# Patient Record
Sex: FEMALE | Race: WHITE | NOT HISPANIC OR LATINO | Employment: FULL TIME | URBAN - METROPOLITAN AREA
[De-identification: names, ages, dates, MRNs, and addresses within clinical notes are randomized per-mention and may not be internally consistent; named-entity substitution may affect disease eponyms.]

---

## 2018-01-11 NOTE — MISCELLANEOUS
Dear Jace : We missed you for your originally scheduled neurological followup appointment with Dr Dolores Mercer  Please call at your earliest convenience to reschedule this appointment  Sincerely,     Kristopher Pollock 102           Electronically signed by: Juan Miller   Nov 22 2016  9:52AM EST Co-author

## 2018-02-13 ENCOUNTER — TRANSCRIBE ORDERS (OUTPATIENT)
Dept: ADMINISTRATIVE | Facility: HOSPITAL | Age: 53
End: 2018-02-13

## 2018-02-13 DIAGNOSIS — R59.1 LYMPHADENOPATHY: Primary | ICD-10-CM

## 2018-02-14 ENCOUNTER — HOSPITAL ENCOUNTER (OUTPATIENT)
Dept: ULTRASOUND IMAGING | Facility: HOSPITAL | Age: 53
Discharge: HOME | End: 2018-02-14
Payer: COMMERCIAL

## 2018-02-14 DIAGNOSIS — R59.1 LYMPHADENOPATHY: ICD-10-CM

## 2018-02-14 PROCEDURE — 76536 US EXAM OF HEAD AND NECK: CPT

## 2018-03-26 ENCOUNTER — TELEPHONE (OUTPATIENT)
Dept: NEUROLOGY | Facility: CLINIC | Age: 53
End: 2018-03-26

## 2018-05-04 ENCOUNTER — TRANSCRIBE ORDERS (OUTPATIENT)
Dept: ADMINISTRATIVE | Facility: HOSPITAL | Age: 53
End: 2018-05-04

## 2018-05-04 DIAGNOSIS — D25.1 INTRAMURAL LEIOMYOMA OF UTERUS: Primary | ICD-10-CM

## 2018-05-17 ENCOUNTER — HOSPITAL ENCOUNTER (OUTPATIENT)
Dept: ULTRASOUND IMAGING | Facility: HOSPITAL | Age: 53
Discharge: HOME | End: 2018-05-17
Payer: COMMERCIAL

## 2018-05-17 DIAGNOSIS — D25.1 INTRAMURAL LEIOMYOMA OF UTERUS: ICD-10-CM

## 2018-05-17 PROCEDURE — 76830 TRANSVAGINAL US NON-OB: CPT

## 2018-05-17 PROCEDURE — 76856 US EXAM PELVIC COMPLETE: CPT

## 2018-06-11 RX ORDER — METHOCARBAMOL 750 MG/1
TABLET, FILM COATED ORAL
Qty: 90 TABLET | Refills: 2 | OUTPATIENT
Start: 2018-06-11

## 2019-04-23 ENCOUNTER — EVALUATION (OUTPATIENT)
Dept: PHYSICAL THERAPY | Facility: CLINIC | Age: 54
End: 2019-04-23
Payer: COMMERCIAL

## 2019-04-23 DIAGNOSIS — M54.50 CHRONIC BILATERAL LOW BACK PAIN WITHOUT SCIATICA: Primary | ICD-10-CM

## 2019-04-23 DIAGNOSIS — G89.29 CHRONIC BILATERAL LOW BACK PAIN WITHOUT SCIATICA: Primary | ICD-10-CM

## 2019-04-23 PROCEDURE — G0283 ELEC STIM OTHER THAN WOUND: HCPCS | Performed by: PHYSICAL THERAPIST

## 2019-04-23 PROCEDURE — 97140 MANUAL THERAPY 1/> REGIONS: CPT | Performed by: PHYSICAL THERAPIST

## 2019-04-23 PROCEDURE — 97014 ELECTRIC STIMULATION THERAPY: CPT | Performed by: PHYSICAL THERAPIST

## 2019-04-23 PROCEDURE — 97161 PT EVAL LOW COMPLEX 20 MIN: CPT | Performed by: PHYSICAL THERAPIST

## 2019-04-30 ENCOUNTER — OFFICE VISIT (OUTPATIENT)
Dept: PHYSICAL THERAPY | Facility: CLINIC | Age: 54
End: 2019-04-30
Payer: COMMERCIAL

## 2019-04-30 DIAGNOSIS — M54.50 CHRONIC BILATERAL LOW BACK PAIN WITHOUT SCIATICA: Primary | ICD-10-CM

## 2019-04-30 DIAGNOSIS — G89.29 CHRONIC BILATERAL LOW BACK PAIN WITHOUT SCIATICA: Primary | ICD-10-CM

## 2019-04-30 PROCEDURE — 97014 ELECTRIC STIMULATION THERAPY: CPT

## 2019-04-30 PROCEDURE — 97140 MANUAL THERAPY 1/> REGIONS: CPT

## 2019-04-30 PROCEDURE — G0283 ELEC STIM OTHER THAN WOUND: HCPCS

## 2019-05-07 ENCOUNTER — OFFICE VISIT (OUTPATIENT)
Dept: PHYSICAL THERAPY | Facility: CLINIC | Age: 54
End: 2019-05-07
Payer: COMMERCIAL

## 2019-05-07 DIAGNOSIS — G89.29 CHRONIC BILATERAL LOW BACK PAIN WITHOUT SCIATICA: Primary | ICD-10-CM

## 2019-05-07 DIAGNOSIS — M54.50 CHRONIC BILATERAL LOW BACK PAIN WITHOUT SCIATICA: Primary | ICD-10-CM

## 2019-05-07 PROCEDURE — 97140 MANUAL THERAPY 1/> REGIONS: CPT

## 2019-05-07 PROCEDURE — 97014 ELECTRIC STIMULATION THERAPY: CPT

## 2019-05-07 PROCEDURE — G0283 ELEC STIM OTHER THAN WOUND: HCPCS

## 2019-05-20 ENCOUNTER — OFFICE VISIT (OUTPATIENT)
Dept: PHYSICAL THERAPY | Facility: CLINIC | Age: 54
End: 2019-05-20
Payer: COMMERCIAL

## 2019-05-20 DIAGNOSIS — M54.50 CHRONIC BILATERAL LOW BACK PAIN WITHOUT SCIATICA: Primary | ICD-10-CM

## 2019-05-20 DIAGNOSIS — G89.29 CHRONIC BILATERAL LOW BACK PAIN WITHOUT SCIATICA: Primary | ICD-10-CM

## 2019-05-20 PROCEDURE — G0283 ELEC STIM OTHER THAN WOUND: HCPCS | Performed by: PHYSICAL THERAPIST

## 2019-05-20 PROCEDURE — 97112 NEUROMUSCULAR REEDUCATION: CPT | Performed by: PHYSICAL THERAPIST

## 2019-05-20 PROCEDURE — 97140 MANUAL THERAPY 1/> REGIONS: CPT | Performed by: PHYSICAL THERAPIST

## 2019-05-20 PROCEDURE — 97014 ELECTRIC STIMULATION THERAPY: CPT | Performed by: PHYSICAL THERAPIST

## 2019-07-23 RX ORDER — METHOCARBAMOL 750 MG/1
1 TABLET, FILM COATED ORAL 3 TIMES DAILY
COMMUNITY
Start: 2015-10-07 | End: 2019-08-01 | Stop reason: ALTCHOICE

## 2019-07-23 RX ORDER — B-COMPLEX WITH VITAMIN C
TABLET ORAL
COMMUNITY

## 2019-07-23 RX ORDER — MELOXICAM 15 MG/1
1 TABLET ORAL DAILY
COMMUNITY
Start: 2015-11-05 | End: 2019-08-01 | Stop reason: ALTCHOICE

## 2019-07-23 RX ORDER — PNV NO.95/FERROUS FUM/FOLIC AC 28MG-0.8MG
TABLET ORAL
COMMUNITY

## 2019-07-23 RX ORDER — VITAMIN B COMPLEX
TABLET ORAL
COMMUNITY

## 2019-07-23 RX ORDER — CLINDAMYCIN AND BENZOYL PEROXIDE 10; 50 MG/G; MG/G
GEL TOPICAL DAILY
COMMUNITY
Start: 2015-01-13

## 2019-07-23 RX ORDER — ERGOCALCIFEROL (VITAMIN D2) 1250 MCG
CAPSULE ORAL
COMMUNITY
Start: 2017-08-12

## 2019-07-24 ENCOUNTER — PREP FOR PROCEDURE (OUTPATIENT)
Dept: GASTROENTEROLOGY | Facility: CLINIC | Age: 54
End: 2019-07-24

## 2019-07-24 ENCOUNTER — OFFICE VISIT (OUTPATIENT)
Dept: GASTROENTEROLOGY | Facility: CLINIC | Age: 54
End: 2019-07-24
Payer: COMMERCIAL

## 2019-07-24 VITALS
HEIGHT: 65 IN | BODY MASS INDEX: 34.42 KG/M2 | SYSTOLIC BLOOD PRESSURE: 142 MMHG | HEART RATE: 94 BPM | WEIGHT: 206.6 LBS | DIASTOLIC BLOOD PRESSURE: 88 MMHG

## 2019-07-24 DIAGNOSIS — K21.00 GASTROESOPHAGEAL REFLUX DISEASE WITH ESOPHAGITIS: Primary | ICD-10-CM

## 2019-07-24 DIAGNOSIS — R14.0 BLOATING: ICD-10-CM

## 2019-07-24 PROCEDURE — 99203 OFFICE O/P NEW LOW 30 MIN: CPT | Performed by: PHYSICIAN ASSISTANT

## 2019-07-24 RX ORDER — DEXLANSOPRAZOLE 60 MG/1
60 CAPSULE, DELAYED RELEASE ORAL DAILY
Qty: 30 CAPSULE | Refills: 2 | Status: SHIPPED | OUTPATIENT
Start: 2019-07-24 | End: 2019-09-17

## 2019-07-24 NOTE — PROGRESS NOTES
[facility] - Outpatient Consultation  Chelsea Barraza 47 y o  female MRN: [id number]  Encounter: [id number]          ASSESSMENT AND PLAN:      1  Gastroesophageal reflux disease with esophagitis    2  Bloating    Symptoms sound related to acid reflux although she reports no improvement with omeprazole, ranitidine or Pepcid  Will plan EGD  Will plan ultrasound  Will start Dexilant    Discuss dietary modification    She is due for a colonoscopy will schedule this as well  Continue probiotic    ______________________________________________________________________    HPI:  59-year-old female presents for evaluation of upper abdominal bloating, chest pain, abdominal pain  Symptoms have been ongoing for approximately 2 years with significant worsening over the past several she reports that she has daily symptoms of epigastric fullness and bloating  This symptom is not constant and it is exacerbated by eating  He and she admits that it does not occur with every meal   She also reports sensatio of a full feeling in her chest   She recently has gained weight  Her bowel movements are regular  There is no rectal bleeding or melena  She denies any dysphagia or hematemesis  She tried omeprazole, ranitidine and famotidine for her symptoms without relief  She is status post cholecystectomy  She had an upper endoscopy in 2 006 was reported as normal   Her last colonoscopy was 5 years ago with Dr Valarie Serrano  She has a history of colon polyps  REVIEW OF SYSTEMS:    CONSTITUTIONAL: Denies any fever, chills, rigors, and weight loss  HEENT: No earache or tinnitus  Denies hearing loss or visual disturbances  CARDIOVASCULAR: No chest pain or palpitations  RESPIRATORY: Denies any cough, hemoptysis, shortness of breath or dyspnea on exertion  GASTROINTESTINAL: As noted in the History of Present Illness  GENITOURINARY: No problems with urination   Denies any hematuria or dysuria  NEUROLOGIC: No dizziness or vertigo, denies headaches  MUSCULOSKELETAL: Denies any muscle or joint pain  SKIN: Denies skin rashes or itching  ENDOCRINE: Denies excessive thirst  Denies intolerance to heat or cold  PSYCHOSOCIAL: Denies depression or anxiety  Denies any recent memory loss  Historical Information   Past Medical History:   Diagnosis Date    Diverticulitis of colon     Hashimoto's disease     Pre-diabetes      History reviewed  No pertinent surgical history  Social History   Social History     Substance and Sexual Activity   Alcohol Use Yes    Comment: occ     Social History     Substance and Sexual Activity   Drug Use Never     Social History     Tobacco Use   Smoking Status Never Smoker   Smokeless Tobacco Never Used     Family History   Problem Relation Age of Onset    Hypertension Mother     Diabetes Mother     Hypertension Father     Diabetes Father        Meds/Allergies       Current Outpatient Medications:     clindamycin-benzoyl peroxide (BENZACLIN) gel    Coenzyme Q10 (COQ10) 100 MG CAPS    Omega-3 Fatty Acids (FISH OIL) 645 MG CAPS    VITAMIN B COMPLEX-C CAPS    dexlansoprazole (DEXILANT) 60 MG capsule    ergocalciferol (ERGOCALCIFEROL) 52676 units capsule    Ferrous Sulfate (IRON) 325 (65 Fe) MG TABS    meloxicam (MOBIC) 15 mg tablet    methocarbamol (ROBAXIN) 750 mg tablet    Specialty Vitamins Products (HEART) 140 MG TABS    No Known Allergies        Objective     Blood pressure 142/88, pulse 94, height 5' 5" (1 651 m), weight 93 7 kg (206 lb 9 6 oz)  Body mass index is 34 38 kg/m²  PHYSICAL EXAM:      General Appearance:   Alert, cooperative, no distress   HEENT:   Normocephalic, atraumatic, anicteric      Neck:  Supple, symmetrical, trachea midline   Lungs:   Clear to auscultation bilaterally; no rales, rhonchi or wheezing; respirations unlabored    Heart[de-identified]   Regular rate and rhythm; no murmur, rub, or gallop     Abdomen:   Soft, non-tender, non-distended; normal bowel sounds; no masses, no organomegaly    Genitalia:   Deferred    Rectal:   Deferred    Extremities:  No cyanosis, clubbing or edema    Pulses:  2+ and symmetric    Skin:  No jaundice, rashes, or lesions    Lymph nodes:  No palpable cervical lymphadenopathy        Lab Results:   No visits with results within 1 Day(s) from this visit  Latest known visit with results is:   No results found for any previous visit  Radiology Results:   No results found

## 2019-07-25 ENCOUNTER — APPOINTMENT (OUTPATIENT)
Dept: LAB | Facility: HOSPITAL | Age: 54
End: 2019-07-25
Payer: COMMERCIAL

## 2019-07-25 DIAGNOSIS — R14.0 BLOATING: ICD-10-CM

## 2019-07-25 LAB
ALBUMIN SERPL BCP-MCNC: 3.9 G/DL (ref 3.5–5)
ALP SERPL-CCNC: 63 U/L (ref 46–116)
ALT SERPL W P-5'-P-CCNC: 24 U/L (ref 12–78)
ANION GAP SERPL CALCULATED.3IONS-SCNC: 6 MMOL/L (ref 4–13)
AST SERPL W P-5'-P-CCNC: 13 U/L (ref 5–45)
BASOPHILS # BLD AUTO: 0.04 THOUSANDS/ΜL (ref 0–0.1)
BASOPHILS NFR BLD AUTO: 1 % (ref 0–1)
BILIRUB SERPL-MCNC: 0.7 MG/DL (ref 0.2–1)
BUN SERPL-MCNC: 14 MG/DL (ref 5–25)
CALCIUM SERPL-MCNC: 9.1 MG/DL (ref 8.3–10.1)
CHLORIDE SERPL-SCNC: 103 MMOL/L (ref 100–108)
CHOLEST SERPL-MCNC: 214 MG/DL (ref 50–200)
CO2 SERPL-SCNC: 30 MMOL/L (ref 21–32)
CREAT SERPL-MCNC: 0.91 MG/DL (ref 0.6–1.3)
EOSINOPHIL # BLD AUTO: 0.4 THOUSAND/ΜL (ref 0–0.61)
EOSINOPHIL NFR BLD AUTO: 6 % (ref 0–6)
ERYTHROCYTE [DISTWIDTH] IN BLOOD BY AUTOMATED COUNT: 12.3 % (ref 11.6–15.1)
GFR SERPL CREATININE-BSD FRML MDRD: 72 ML/MIN/1.73SQ M
GLUCOSE P FAST SERPL-MCNC: 142 MG/DL (ref 65–99)
HCT VFR BLD AUTO: 39.9 % (ref 34.8–46.1)
HDLC SERPL-MCNC: 57 MG/DL (ref 40–60)
HGB BLD-MCNC: 12.9 G/DL (ref 11.5–15.4)
IMM GRANULOCYTES # BLD AUTO: 0.01 THOUSAND/UL (ref 0–0.2)
IMM GRANULOCYTES NFR BLD AUTO: 0 % (ref 0–2)
LDLC SERPL CALC-MCNC: 135 MG/DL (ref 0–100)
LYMPHOCYTES # BLD AUTO: 2.25 THOUSANDS/ΜL (ref 0.6–4.47)
LYMPHOCYTES NFR BLD AUTO: 33 % (ref 14–44)
MCH RBC QN AUTO: 29.5 PG (ref 26.8–34.3)
MCHC RBC AUTO-ENTMCNC: 32.3 G/DL (ref 31.4–37.4)
MCV RBC AUTO: 91 FL (ref 82–98)
MONOCYTES # BLD AUTO: 0.46 THOUSAND/ΜL (ref 0.17–1.22)
MONOCYTES NFR BLD AUTO: 7 % (ref 4–12)
NEUTROPHILS # BLD AUTO: 3.63 THOUSANDS/ΜL (ref 1.85–7.62)
NEUTS SEG NFR BLD AUTO: 53 % (ref 43–75)
NONHDLC SERPL-MCNC: 157 MG/DL
NRBC BLD AUTO-RTO: 0 /100 WBCS
PLATELET # BLD AUTO: 259 THOUSANDS/UL (ref 149–390)
PMV BLD AUTO: 10.8 FL (ref 8.9–12.7)
POTASSIUM SERPL-SCNC: 4.2 MMOL/L (ref 3.5–5.3)
PROT SERPL-MCNC: 7.8 G/DL (ref 6.4–8.2)
RBC # BLD AUTO: 4.38 MILLION/UL (ref 3.81–5.12)
SODIUM SERPL-SCNC: 139 MMOL/L (ref 136–145)
T4 FREE SERPL-MCNC: 0.82 NG/DL (ref 0.76–1.46)
TRIGL SERPL-MCNC: 110 MG/DL
TSH SERPL DL<=0.05 MIU/L-ACNC: 1.83 UIU/ML (ref 0.36–3.74)
WBC # BLD AUTO: 6.79 THOUSAND/UL (ref 4.31–10.16)

## 2019-07-25 PROCEDURE — 82784 ASSAY IGA/IGD/IGG/IGM EACH: CPT

## 2019-07-25 PROCEDURE — 36415 COLL VENOUS BLD VENIPUNCTURE: CPT

## 2019-07-25 PROCEDURE — 86255 FLUORESCENT ANTIBODY SCREEN: CPT

## 2019-07-25 PROCEDURE — 84443 ASSAY THYROID STIM HORMONE: CPT

## 2019-07-25 PROCEDURE — 84439 ASSAY OF FREE THYROXINE: CPT

## 2019-07-25 PROCEDURE — 83516 IMMUNOASSAY NONANTIBODY: CPT

## 2019-07-25 PROCEDURE — 80053 COMPREHEN METABOLIC PANEL: CPT

## 2019-07-25 PROCEDURE — 80061 LIPID PANEL: CPT

## 2019-07-25 PROCEDURE — 85025 COMPLETE CBC W/AUTO DIFF WBC: CPT

## 2019-07-26 ENCOUNTER — HOSPITAL ENCOUNTER (OUTPATIENT)
Dept: ULTRASOUND IMAGING | Facility: CLINIC | Age: 54
Discharge: HOME | End: 2019-07-26
Payer: COMMERCIAL

## 2019-07-26 DIAGNOSIS — R14.0 BLOATING: ICD-10-CM

## 2019-07-26 LAB
ENDOMYSIUM IGA SER QL: NEGATIVE
GLIADIN PEPTIDE IGA SER-ACNC: 5 UNITS (ref 0–19)
GLIADIN PEPTIDE IGG SER-ACNC: 3 UNITS (ref 0–19)
IGA SERPL-MCNC: 258 MG/DL (ref 87–352)
TTG IGA SER-ACNC: <2 U/ML (ref 0–3)
TTG IGG SER-ACNC: 3 U/ML (ref 0–5)

## 2019-07-26 PROCEDURE — 76700 US EXAM ABDOM COMPLETE: CPT

## 2019-07-29 ENCOUNTER — TELEPHONE (OUTPATIENT)
Dept: GASTROENTEROLOGY | Facility: CLINIC | Age: 54
End: 2019-07-29

## 2019-07-29 NOTE — TELEPHONE ENCOUNTER
----- Message from Maryam Yap PA-C sent at 7/29/2019  3:33 PM EDT -----  Please let her know her labs were normal except her cholesterol was slightly elevated and her blood sugar was slightly elevated - she should f/u with her PCP for further treatment recommendations

## 2019-07-31 ENCOUNTER — ANESTHESIA EVENT (OUTPATIENT)
Dept: GASTROENTEROLOGY | Facility: HOSPITAL | Age: 54
End: 2019-07-31

## 2019-08-01 ENCOUNTER — TREATMENT (OUTPATIENT)
Dept: GASTROENTEROLOGY | Facility: CLINIC | Age: 54
End: 2019-08-01

## 2019-08-01 ENCOUNTER — ANESTHESIA (OUTPATIENT)
Dept: GASTROENTEROLOGY | Facility: HOSPITAL | Age: 54
End: 2019-08-01

## 2019-08-01 ENCOUNTER — HOSPITAL ENCOUNTER (OUTPATIENT)
Dept: GASTROENTEROLOGY | Facility: HOSPITAL | Age: 54
Setting detail: OUTPATIENT SURGERY
Discharge: HOME | End: 2019-08-01
Attending: INTERNAL MEDICINE
Payer: COMMERCIAL

## 2019-08-01 VITALS
BODY MASS INDEX: 33.65 KG/M2 | OXYGEN SATURATION: 100 % | HEIGHT: 65 IN | SYSTOLIC BLOOD PRESSURE: 134 MMHG | RESPIRATION RATE: 16 BRPM | HEART RATE: 65 BPM | TEMPERATURE: 97.6 F | WEIGHT: 201.94 LBS | DIASTOLIC BLOOD PRESSURE: 67 MMHG

## 2019-08-01 DIAGNOSIS — R14.0 BLOATING: ICD-10-CM

## 2019-08-01 DIAGNOSIS — K21.00 GASTROESOPHAGEAL REFLUX DISEASE WITH ESOPHAGITIS: ICD-10-CM

## 2019-08-01 DIAGNOSIS — K21.9 GASTROESOPHAGEAL REFLUX DISEASE WITHOUT ESOPHAGITIS: Primary | ICD-10-CM

## 2019-08-01 LAB
EXT PREGNANCY TEST URINE: NEGATIVE
EXT. CONTROL: NORMAL

## 2019-08-01 PROCEDURE — 43239 EGD BIOPSY SINGLE/MULTIPLE: CPT | Performed by: INTERNAL MEDICINE

## 2019-08-01 PROCEDURE — 88305 TISSUE EXAM BY PATHOLOGIST: CPT | Performed by: PATHOLOGY

## 2019-08-01 PROCEDURE — G0121 COLON CA SCRN NOT HI RSK IND: HCPCS | Performed by: INTERNAL MEDICINE

## 2019-08-01 PROCEDURE — 81025 URINE PREGNANCY TEST: CPT | Performed by: ANESTHESIOLOGY

## 2019-08-01 RX ORDER — KETAMINE HCL IN NACL, ISO-OSM 100MG/10ML
SYRINGE (ML) INJECTION AS NEEDED
Status: DISCONTINUED | OUTPATIENT
Start: 2019-08-01 | End: 2019-08-01 | Stop reason: SURG

## 2019-08-01 RX ORDER — LIDOCAINE HYDROCHLORIDE 20 MG/ML
INJECTION, SOLUTION INFILTRATION; PERINEURAL AS NEEDED
Status: DISCONTINUED | OUTPATIENT
Start: 2019-08-01 | End: 2019-08-01 | Stop reason: SURG

## 2019-08-01 RX ORDER — SODIUM CHLORIDE, SODIUM LACTATE, POTASSIUM CHLORIDE, CALCIUM CHLORIDE 600; 310; 30; 20 MG/100ML; MG/100ML; MG/100ML; MG/100ML
125 INJECTION, SOLUTION INTRAVENOUS CONTINUOUS
Status: DISCONTINUED | OUTPATIENT
Start: 2019-08-01 | End: 2019-08-05 | Stop reason: HOSPADM

## 2019-08-01 RX ORDER — PANTOPRAZOLE SODIUM 40 MG/1
40 TABLET, DELAYED RELEASE ORAL DAILY
Qty: 31 TABLET | Refills: 5 | Status: SHIPPED | OUTPATIENT
Start: 2019-08-01

## 2019-08-01 RX ORDER — PROPOFOL 10 MG/ML
INJECTION, EMULSION INTRAVENOUS AS NEEDED
Status: DISCONTINUED | OUTPATIENT
Start: 2019-08-01 | End: 2019-08-01 | Stop reason: SURG

## 2019-08-01 RX ORDER — SODIUM CHLORIDE, SODIUM LACTATE, POTASSIUM CHLORIDE, CALCIUM CHLORIDE 600; 310; 30; 20 MG/100ML; MG/100ML; MG/100ML; MG/100ML
INJECTION, SOLUTION INTRAVENOUS CONTINUOUS PRN
Status: DISCONTINUED | OUTPATIENT
Start: 2019-08-01 | End: 2019-08-01 | Stop reason: SURG

## 2019-08-01 RX ORDER — LIDOCAINE HYDROCHLORIDE 20 MG/ML
INJECTION, SOLUTION INFILTRATION; PERINEURAL AS NEEDED
Status: DISCONTINUED | OUTPATIENT
Start: 2019-08-01 | End: 2019-08-01

## 2019-08-01 RX ADMIN — SODIUM CHLORIDE, SODIUM LACTATE, POTASSIUM CHLORIDE, AND CALCIUM CHLORIDE: .6; .31; .03; .02 INJECTION, SOLUTION INTRAVENOUS at 12:41

## 2019-08-01 RX ADMIN — Medication 20 MG: at 12:51

## 2019-08-01 RX ADMIN — PROPOFOL 130 MG: 10 INJECTION, EMULSION INTRAVENOUS at 12:52

## 2019-08-01 RX ADMIN — PROPOFOL 30 MG: 10 INJECTION, EMULSION INTRAVENOUS at 12:58

## 2019-08-01 RX ADMIN — PROPOFOL 20 MG: 10 INJECTION, EMULSION INTRAVENOUS at 13:01

## 2019-08-01 RX ADMIN — LIDOCAINE HYDROCHLORIDE 5 ML: 20 INJECTION, SOLUTION INFILTRATION; PERINEURAL at 12:51

## 2019-08-01 RX ADMIN — PROPOFOL 20 MG: 10 INJECTION, EMULSION INTRAVENOUS at 12:55

## 2019-08-01 NOTE — ANESTHESIA POSTPROCEDURE EVALUATION
Post-Op Assessment Note    CV Status:  Stable  Pain Score: 0    Pain management: adequate     Mental Status:  Sleepy   Hydration Status:  Stable   PONV Controlled:  None   Airway Patency:  Patent and adequate   Post Op Vitals Reviewed: Yes      Staff: CRNA           BP   150/78   Temp      Pulse  63   Resp   16   SpO2   99%

## 2019-08-01 NOTE — ANESTHESIA PREPROCEDURE EVALUATION
Review of Systems/Medical History  Patient summary reviewed        Cardiovascular  Negative cardio ROS    Pulmonary  Negative pulmonary ROS        GI/Hepatic  Negative GI/hepatic ROS          Negative  ROS        Endo/Other  Diabetes Diet controlled, History of thyroid disease ,      GYN  Negative gynecology ROS          Hematology  Negative hematology ROS      Musculoskeletal  Negative musculoskeletal ROS        Neurology  Negative neurology ROS      Psychology   Negative psychology ROS              Physical Exam    Airway    Mallampati score: I  TM Distance: >3 FB  Neck ROM: full     Dental       Cardiovascular  Comment: Negative ROS, Cardiovascular exam normal    Pulmonary  Pulmonary exam normal     Other Findings        Anesthesia Plan  ASA Score- 2     Anesthesia Type- IV sedation with anesthesia with ASA Monitors  Additional Monitors:   Airway Plan:         Plan Factors-    Induction- intravenous  Postoperative Plan-     Informed Consent- Anesthetic plan and risks discussed with patient  I personally reviewed this patient with the CRNA  Discussed and agreed on the Anesthesia Plan with the CRNA  Felecia Chamorro

## 2019-08-01 NOTE — DISCHARGE INSTRUCTIONS
Colonoscopy   WHAT YOU NEED TO KNOW:   A colonoscopy is a procedure to examine the inside of your colon (intestine) with a scope  Polyps or tissue growths may have been removed during your colonoscopy  It is normal to feel bloated and to have some abdominal discomfort  You should be passing gas  If you have hemorrhoids or you had polyps removed, you may have a small amount of bleeding  DISCHARGE INSTRUCTIONS:   Seek care immediately if:   · You have a large amount of bright red blood in your bowel movements  · Your abdomen is hard and firm and you have severe pain  · You have sudden trouble breathing  Contact your healthcare provider if:   · You develop a rash or hives  · You have a fever within 24 hours of your procedure       · You have not had a bowel movement for 3 days after your procedure  · You have questions or concerns about your condition or care  Activity:   · Do not lift, strain, or run  for 3 days after your procedure  · Rest after your procedure  You have been given medicine to relax you  Do not  drive or make important decisions until the day after your procedure  Return to your normal activity as directed  · Relieve gas and discomfort from bloating  by lying on your right side with a heating pad on your abdomen  You may need to take short walks to help the gas move out  Eat small meals until bloating is relieved  If you had polyps removed: For 7 days after your procedure:  · Do not  take aspirin  · Do not  go on long car rides  Follow up with your healthcare provider as directed:  Write down your questions so you remember to ask them during your visits  © 2017 [employer] is for End User's use only and may not be sold, redistributed or otherwise used for commercial purposes  All illustrations and images included in CareNotes® are the copyrighted property of [employer]  or [employer]    The above information is an  only  It is not intended as medical advice for individual conditions or treatments  Talk to your doctor, nurse or pharmacist before following any medical regimen to see if it is safe and effective for you

## 2019-08-01 NOTE — H&P
History and Physical -  [facility]  Asael Bazan 47 y o  female MRN: [id number]      HPI: Asael Bazan is a 47y o  year old female who presents for gastroesophageal reflux disease, bloating, abdominal pain, personal history of colon polyps      REVIEW OF SYSTEMS: Per the HPI, and otherwise unremarkable  Historical Information   Past Medical History:   Diagnosis Date    Diverticulitis of colon     Hashimoto's disease     Pre-diabetes      History reviewed  No pertinent surgical history  Social History   Social History     Substance and Sexual Activity   Alcohol Use Yes    Comment: occ     Social History     Substance and Sexual Activity   Drug Use Never     Social History     Tobacco Use   Smoking Status Never Smoker   Smokeless Tobacco Never Used     Family History   Problem Relation Age of Onset    Hypertension Mother     Diabetes Mother     Hypertension Father     Diabetes Father        Meds/Allergies       (Not in a hospital admission)    No Known Allergies    Objective     There were no vitals taken for this visit  PHYSICAL EXAM    Gen: NAD  CV: RRR  CHEST: Clear  ABD: soft, NT/ND  EXT: no edema      ASSESSMENT/PLAN:  This is a 47y o  year old female here for esophagogastroduodenoscopy with possible biopsy, colonoscopy, and she is stable and optimized for her procedure

## 2019-08-05 ENCOUNTER — TELEPHONE (OUTPATIENT)
Dept: GASTROENTEROLOGY | Facility: CLINIC | Age: 54
End: 2019-08-05

## 2019-08-05 NOTE — TELEPHONE ENCOUNTER
rcvd fax    Pt did not  dexilant and would not be covered unless recoreds show omeprrazole, lansoprazole pantoprazole, rabeproazole or esomeprazle wer possibly used first in a two or three step medication coverage

## 2019-08-15 ENCOUNTER — TELEPHONE (OUTPATIENT)
Dept: GASTROENTEROLOGY | Facility: CLINIC | Age: 54
End: 2019-08-15

## 2019-08-15 NOTE — TELEPHONE ENCOUNTER
Dr Mikey Meyer - Patient called to get results of colonoscopy EGD 08/01/19   Please call [phone]

## 2019-09-06 ENCOUNTER — HOSPITAL ENCOUNTER (EMERGENCY)
Facility: HOSPITAL | Age: 54
Discharge: HOME | End: 2019-09-06
Attending: EMERGENCY MEDICINE | Admitting: EMERGENCY MEDICINE
Payer: COMMERCIAL

## 2019-09-06 ENCOUNTER — APPOINTMENT (EMERGENCY)
Dept: CT IMAGING | Facility: HOSPITAL | Age: 54
End: 2019-09-06
Payer: COMMERCIAL

## 2019-09-06 VITALS
RESPIRATION RATE: 16 BRPM | HEART RATE: 72 BPM | BODY MASS INDEX: 33.28 KG/M2 | OXYGEN SATURATION: 96 % | TEMPERATURE: 98.4 F | WEIGHT: 200 LBS | SYSTOLIC BLOOD PRESSURE: 108 MMHG | DIASTOLIC BLOOD PRESSURE: 67 MMHG

## 2019-09-06 DIAGNOSIS — R10.9 LEFT LATERAL ABDOMINAL PAIN: Primary | ICD-10-CM

## 2019-09-06 LAB
ALBUMIN SERPL BCP-MCNC: 3.8 G/DL (ref 3.5–5)
ALP SERPL-CCNC: 62 U/L (ref 46–116)
ALT SERPL W P-5'-P-CCNC: 33 U/L (ref 12–78)
ANION GAP SERPL CALCULATED.3IONS-SCNC: 8 MMOL/L (ref 4–13)
AST SERPL W P-5'-P-CCNC: 18 U/L (ref 5–45)
BACTERIA UR QL AUTO: ABNORMAL /HPF
BASOPHILS # BLD AUTO: 0.04 THOUSANDS/ΜL (ref 0–0.1)
BASOPHILS NFR BLD AUTO: 1 % (ref 0–1)
BILIRUB SERPL-MCNC: 0.4 MG/DL (ref 0.2–1)
BILIRUB UR QL STRIP: NEGATIVE
BUN SERPL-MCNC: 17 MG/DL (ref 5–25)
CALCIUM SERPL-MCNC: 8.9 MG/DL (ref 8.3–10.1)
CHLORIDE SERPL-SCNC: 102 MMOL/L (ref 100–108)
CLARITY UR: CLEAR
CO2 SERPL-SCNC: 29 MMOL/L (ref 21–32)
COLOR UR: YELLOW
CREAT SERPL-MCNC: 0.91 MG/DL (ref 0.6–1.3)
EOSINOPHIL # BLD AUTO: 0.27 THOUSAND/ΜL (ref 0–0.61)
EOSINOPHIL NFR BLD AUTO: 3 % (ref 0–6)
ERYTHROCYTE [DISTWIDTH] IN BLOOD BY AUTOMATED COUNT: 12.4 % (ref 11.6–15.1)
EXT PREG TEST URINE: NEGATIVE
EXT. CONTROL ED NAV: NORMAL
GFR SERPL CREATININE-BSD FRML MDRD: 72 ML/MIN/1.73SQ M
GLUCOSE SERPL-MCNC: 123 MG/DL (ref 65–140)
GLUCOSE UR STRIP-MCNC: NEGATIVE MG/DL
HCT VFR BLD AUTO: 37.8 % (ref 34.8–46.1)
HGB BLD-MCNC: 12.1 G/DL (ref 11.5–15.4)
HGB UR QL STRIP.AUTO: ABNORMAL
IMM GRANULOCYTES # BLD AUTO: 0.03 THOUSAND/UL (ref 0–0.2)
IMM GRANULOCYTES NFR BLD AUTO: 0 % (ref 0–2)
KETONES UR STRIP-MCNC: NEGATIVE MG/DL
LEUKOCYTE ESTERASE UR QL STRIP: NEGATIVE
LIPASE SERPL-CCNC: 212 U/L (ref 73–393)
LYMPHOCYTES # BLD AUTO: 1.96 THOUSANDS/ΜL (ref 0.6–4.47)
LYMPHOCYTES NFR BLD AUTO: 23 % (ref 14–44)
MCH RBC QN AUTO: 29.4 PG (ref 26.8–34.3)
MCHC RBC AUTO-ENTMCNC: 32 G/DL (ref 31.4–37.4)
MCV RBC AUTO: 92 FL (ref 82–98)
MONOCYTES # BLD AUTO: 0.65 THOUSAND/ΜL (ref 0.17–1.22)
MONOCYTES NFR BLD AUTO: 7 % (ref 4–12)
NEUTROPHILS # BLD AUTO: 5.78 THOUSANDS/ΜL (ref 1.85–7.62)
NEUTS SEG NFR BLD AUTO: 66 % (ref 43–75)
NITRITE UR QL STRIP: NEGATIVE
NON-SQ EPI CELLS URNS QL MICRO: ABNORMAL /HPF
NRBC BLD AUTO-RTO: 0 /100 WBCS
PH UR STRIP.AUTO: 6.5 [PH]
PLATELET # BLD AUTO: 289 THOUSANDS/UL (ref 149–390)
PMV BLD AUTO: 10.6 FL (ref 8.9–12.7)
POTASSIUM SERPL-SCNC: 3.7 MMOL/L (ref 3.5–5.3)
PROT SERPL-MCNC: 7.6 G/DL (ref 6.4–8.2)
PROT UR STRIP-MCNC: NEGATIVE MG/DL
RBC # BLD AUTO: 4.11 MILLION/UL (ref 3.81–5.12)
RBC #/AREA URNS AUTO: ABNORMAL /HPF
SODIUM SERPL-SCNC: 139 MMOL/L (ref 136–145)
SP GR UR STRIP.AUTO: <=1.005 (ref 1–1.03)
UROBILINOGEN UR QL STRIP.AUTO: 0.2 E.U./DL
WBC # BLD AUTO: 8.73 THOUSAND/UL (ref 4.31–10.16)
WBC #/AREA URNS AUTO: ABNORMAL /HPF

## 2019-09-06 PROCEDURE — 74177 CT ABD & PELVIS W/CONTRAST: CPT

## 2019-09-06 PROCEDURE — 83690 ASSAY OF LIPASE: CPT | Performed by: EMERGENCY MEDICINE

## 2019-09-06 PROCEDURE — 99284 EMERGENCY DEPT VISIT MOD MDM: CPT

## 2019-09-06 PROCEDURE — 81025 URINE PREGNANCY TEST: CPT | Performed by: EMERGENCY MEDICINE

## 2019-09-06 PROCEDURE — 96374 THER/PROPH/DIAG INJ IV PUSH: CPT

## 2019-09-06 PROCEDURE — 80053 COMPREHEN METABOLIC PANEL: CPT | Performed by: EMERGENCY MEDICINE

## 2019-09-06 PROCEDURE — 36415 COLL VENOUS BLD VENIPUNCTURE: CPT | Performed by: EMERGENCY MEDICINE

## 2019-09-06 PROCEDURE — 99284 EMERGENCY DEPT VISIT MOD MDM: CPT | Performed by: EMERGENCY MEDICINE

## 2019-09-06 PROCEDURE — 85025 COMPLETE CBC W/AUTO DIFF WBC: CPT | Performed by: EMERGENCY MEDICINE

## 2019-09-06 PROCEDURE — 81001 URINALYSIS AUTO W/SCOPE: CPT | Performed by: EMERGENCY MEDICINE

## 2019-09-06 RX ORDER — NAPROXEN 500 MG/1
500 TABLET ORAL 2 TIMES DAILY WITH MEALS
Qty: 14 TABLET | Refills: 0 | Status: SHIPPED | OUTPATIENT
Start: 2019-09-06 | End: 2019-09-13

## 2019-09-06 RX ORDER — KETOROLAC TROMETHAMINE 30 MG/ML
15 INJECTION, SOLUTION INTRAMUSCULAR; INTRAVENOUS ONCE
Status: COMPLETED | OUTPATIENT
Start: 2019-09-06 | End: 2019-09-06

## 2019-09-06 RX ADMIN — IOHEXOL 100 ML: 350 INJECTION, SOLUTION INTRAVENOUS at 10:25

## 2019-09-06 RX ADMIN — KETOROLAC TROMETHAMINE 15 MG: 30 INJECTION, SOLUTION INTRAMUSCULAR at 09:43

## 2019-09-06 NOTE — DISCHARGE INSTRUCTIONS
Your CT scan here today showed some small cysts to your ovaries and to your right adrenal gland, all of which

## 2019-09-06 NOTE — ED NOTES
Discharge instructions reviewed, patient has no new complaints or concerns at time of discharge  Patient ambulated out of department, steady gait, vss  Patient accompanied out of department by her         Mckenna Otoole RN  09/06/19 0610

## 2019-09-06 NOTE — ED PROVIDER NOTES
History  Chief Complaint   Patient presents with    Flank Pain     Pt c/o left sided flank pain, radiating from LLQ abdomen  Pt reports intermittent pain x1 week  Pt denies changes in urination  HPI     63-year-old female with history of fibroids status post remote embolization, who presents for evaluation of left-sided abdominal pain  Patient endorses development of pain to the entire left side of her abdomen 1 week ago  Described as sharp, radiating to the left flank  Was initially intermittent, and worse with certain movements, now is more constant and unrelenting  Symptoms were preceded by a month of feeling of abdominal fullness and early satiety when eating  She had an endoscopy and colonoscopy performed at the beginning of last month that were unremarkable  She endorses nausea, but no vomiting  No fevers, chills, or history of abdominal surgeries other than embolization for heavy menstrual bleeding from fibroids  No diarrhea, constipation, blood in her stool, or black tarry stools  Patient states she is currently going through menopause, her menstrual periods are very irregular with last 1 in June, she recently started bleeding again 2 days ago, bleeding was initially heavy but is now much lighter  Endorses a sensation of fullness in her pelvis without vaginal discharge or pain  Denies pain in her back  Prior to Admission Medications   Prescriptions Last Dose Informant Patient Reported? Taking?    Coenzyme Q10 (COQ10) 100 MG CAPS  Self Yes No   Sig: Take by mouth   Ferrous Sulfate (IRON) 325 (65 Fe) MG TABS  Self Yes No   Sig: Take by mouth   Omega-3 Fatty Acids (FISH OIL) 645 MG CAPS  Self Yes No   Sig: Take 2 capsules by mouth daily   Specialty Vitamins Products (HEART) 140 MG TABS  Self Yes No   Sig: Take by mouth   VITAMIN B COMPLEX-C CAPS  Self Yes No   Sig: Take by mouth   clindamycin-benzoyl peroxide (BENZACLIN) gel  Self Yes No   Sig: Apply topically daily   dexlansoprazole (DEXILANT) 60 MG capsule   No No   Sig: Take 1 capsule (60 mg total) by mouth daily   ergocalciferol (ERGOCALCIFEROL) 88360 units capsule  Self Yes No   Sig: TAKE 1 CAPSULE (50,000 UNITS) BY MOUTH WEEKLY   pantoprazole (PROTONIX) 40 mg tablet   No No   Sig: Take 1 tablet (40 mg total) by mouth daily      Facility-Administered Medications: None       Past Medical History:   Diagnosis Date    Colon polyp     Diverticulitis of colon     Hashimoto's disease     Pre-diabetes        Past Surgical History:   Procedure Laterality Date    CHOLECYSTECTOMY      COLON SURGERY      TONSILLECTOMY      UTERINE FIBROID EMBOLIZATION         Family History   Problem Relation Age of Onset    Hypertension Mother     Diabetes Mother     Hypertension Father     Diabetes Father      I have reviewed and agree with the history as documented  Social History     Tobacco Use    Smoking status: Never Smoker    Smokeless tobacco: Never Used   Substance Use Topics    Alcohol use: Yes     Comment: occasional    Drug use: Never        Review of Systems   Constitutional: Negative for chills and fever  HENT: Negative for congestion  Eyes: Negative for visual disturbance  Respiratory: Negative for cough and shortness of breath  Cardiovascular: Negative for chest pain and leg swelling  Gastrointestinal: Positive for abdominal pain and nausea  Negative for blood in stool, constipation, diarrhea and vomiting  Genitourinary: Positive for flank pain (L flank)  Negative for dysuria and frequency  Musculoskeletal: Negative for arthralgias, back pain, neck pain and neck stiffness  Skin: Negative for rash  Neurological: Negative for weakness, numbness and headaches  Psychiatric/Behavioral: Negative for agitation, behavioral problems and confusion  Physical Exam  Physical Exam   Constitutional: She is oriented to person, place, and time  She appears well-developed and well-nourished  No distress     HENT:   Head: Normocephalic and atraumatic  Right Ear: External ear normal    Left Ear: External ear normal    Nose: Nose normal    Mouth/Throat: Oropharynx is clear and moist    Eyes: Conjunctivae are normal    Neck: Normal range of motion  Neck supple  Cardiovascular: Normal rate, regular rhythm and normal heart sounds  Exam reveals no gallop and no friction rub  No murmur heard  Pulmonary/Chest: Effort normal and breath sounds normal  No respiratory distress  She has no wheezes  She has no rales  Abdominal: Soft  Bowel sounds are normal  She exhibits no distension  There is no tenderness  There is no guarding  Musculoskeletal: Normal range of motion  She exhibits no edema or deformity  Neurological: She is alert and oriented to person, place, and time  She exhibits normal muscle tone  Skin: Skin is warm and dry  She is not diaphoretic         Vital Signs  ED Triage Vitals [09/06/19 0909]   Temperature Pulse Respirations Blood Pressure SpO2   98 4 °F (36 9 °C) 91 16 120/96 99 %      Temp Source Heart Rate Source Patient Position - Orthostatic VS BP Location FiO2 (%)   Oral Monitor Sitting Right arm --      Pain Score       7           Vitals:    09/06/19 0909 09/06/19 1100   BP: 120/96 108/67   Pulse: 91 72   Patient Position - Orthostatic VS: Sitting Lying         Visual Acuity      ED Medications  Medications   ketorolac (TORADOL) injection 15 mg (15 mg Intravenous Given 9/6/19 0943)   iohexol (OMNIPAQUE) 350 MG/ML injection (MULTI-DOSE) 100 mL (100 mL Intravenous Given 9/6/19 1025)       Diagnostic Studies  Results Reviewed     Procedure Component Value Units Date/Time    Urinalysis with microscopic [[id number]]  (Abnormal) Collected:  09/06/19 0938    Lab Status:  Final result Specimen:  Urine, Clean Catch Updated:  09/06/19 1011     Clarity, UA Clear     Color, UA Yellow     Specific Gravity, UA <=1 005     pH, UA 6 5     Glucose, UA Negative mg/dl      Ketones, UA Negative mg/dl      Blood, UA Large Protein, UA Negative mg/dl      Nitrite, UA Negative     Bilirubin, UA Negative     Urobilinogen, UA 0 2 E U /dl      Leukocytes, UA Negative     WBC, UA None Seen /hpf      RBC, UA 4-10 /hpf      Bacteria, UA None Seen /hpf      Epithelial Cells Occasional /hpf     Comprehensive metabolic panel [[id number]] Collected:  09/06/19 0937    Lab Status:  Final result Specimen:  Blood from Arm, Right Updated:  09/06/19 1010     Sodium 139 mmol/L      Potassium 3 7 mmol/L      Chloride 102 mmol/L      CO2 29 mmol/L      ANION GAP 8 mmol/L      BUN 17 mg/dL      Creatinine 0 91 mg/dL      Glucose 123 mg/dL      Calcium 8 9 mg/dL      AST 18 U/L      ALT 33 U/L      Alkaline Phosphatase 62 U/L      Total Protein 7 6 g/dL      Albumin 3 8 g/dL      Total Bilirubin 0 40 mg/dL      eGFR 72 ml/min/1 73sq m     Narrative:       [place] guidelines for Chronic Kidney Disease (CKD):     Stage 1 with normal or high GFR (GFR > 90 mL/min/1 73 square meters)    Stage 2 Mild CKD (GFR = 60-89 mL/min/1 73 square meters)    Stage 3A Moderate CKD (GFR = 45-59 mL/min/1 73 square meters)    Stage 3B Moderate CKD (GFR = 30-44 mL/min/1 73 square meters)    Stage 4 Severe CKD (GFR = 15-29 mL/min/1 73 square meters)    Stage 5 End Stage CKD (GFR <15 mL/min/1 73 square meters)  Note: GFR calculation is accurate only with a steady state creatinine    Lipase [[id number]]  (Normal) Collected:  09/06/19 0937    Lab Status:  Final result Specimen:  Blood from Arm, Right Updated:  09/06/19 1010     Lipase 212 u/L     CBC and differential [[id number]] Collected:  09/06/19 0937    Lab Status:  Final result Specimen:  Blood from Arm, Right Updated:  09/06/19 0948     WBC 8 73 Thousand/uL      RBC 4 11 Million/uL      Hemoglobin 12 1 g/dL      Hematocrit 37 8 %      MCV 92 fL      MCH 29 4 pg      MCHC 32 0 g/dL      RDW 12 4 %      MPV 10 6 fL      Platelets 050 Thousands/uL      nRBC 0 /100 WBCs      Neutrophils Relative 66 % Immat GRANS % 0 %      Lymphocytes Relative 23 %      Monocytes Relative 7 %      Eosinophils Relative 3 %      Basophils Relative 1 %      Neutrophils Absolute 5 78 Thousands/µL      Immature Grans Absolute 0 03 Thousand/uL      Lymphocytes Absolute 1 96 Thousands/µL      Monocytes Absolute 0 65 Thousand/µL      Eosinophils Absolute 0 27 Thousand/µL      Basophils Absolute 0 04 Thousands/µL     POCT pregnancy, urine [[id number]]  (Normal) Resulted:  09/06/19 0942    Lab Status:  Final result Updated:  09/06/19 0943     EXT PREG TEST UR (Ref: Negative) Negative     Control Valid                 CT abdomen pelvis with contrast   Final Result by Oleksandr Hidalgo MD (09/06 1114)      No acute inflammatory stranding      Diverticulosis  Heterogenous uterus with the prominent junctional zone, consider MRI with the prominence of the junctional zone may be due to adenomyosis or due to sequela of fibroid embolization  Consider MRI for further evaluation      The right adnexal cyst measuring about 3 1 x 2 7 cm likely a functional cyst   Nabothian cysts within the cervix       I personally discussed this study with Tony Dumont on 9/6/2019 at 11:11 AM                    Workstation performed: [id number]                    Procedures  Procedures       ED Course                               MDM  Number of Diagnoses or Management Options  Left lateral abdominal pain: new and requires workup  Diagnosis management comments: Generally well appearing  Afebrile and hemodynamically stable  Patient has mild generalized discomfort with palpation of her abdomen, worse to the left upper quadrant  Patient does not have any chest pain, pain is not pleuritic, no shortness of breath  CBC with no leukocytosis and normal hemoglobin  CMP unremarkable with normal renal function and liver function  Lipase within normal limits  UA with blood that the patient is currently menstruating, no evidence of UTI      CT abdomen pelvis with no acute inflammatory stranding  No evidence of diverticulitis  Patient does have a heterogeneous uterus with recommendation for outpatient MRI for further evaluation  Discussed with patient that this is likely secondary to her history of fibroids, however cannot exclude malignancy based on this CT scan, so recommend that she see her OB gyn to discuss MRI versus uterine biopsy as soon as possible  She has a previously scheduled appointment scheduled for 2 weeks for now  Patient also found to have a right adnexal cyst, likely functional, nabothian cysts within the cervix, and small ovarian cysts  These are all unlikely to be the cause of her pain, but were explicitly written in the discharge instructions for her to follow up with her doctor  Discussed at length with the patient and her  at bedside that based on her labs and imaging studies here today as well as history and physical exam I have a low suspicion for acute life-threatening pathology such as SBO, appendicitis, ovarian torsion, mesenteric ischemia, PE, AAA, or pyelonephritis, however the underlying cause of her pain remains unknown  Recommend follow up with her OB gyn and primary care doctor as needed  She is tolerating oral intake and is not in distress, so will discharge for outpatient follow-up  Pain improved with Toradol, so will provide prescription for naproxen  Patient discharged in good condition         Amount and/or Complexity of Data Reviewed  Clinical lab tests: ordered and reviewed  Tests in the radiology section of CPT®: ordered and reviewed  Review and summarize past medical records: yes    Patient Progress  Patient progress: stable           Disposition  Final diagnoses:   Left lateral abdominal pain     Time reflects when diagnosis was documented in both MDM as applicable and the Disposition within this note     Time User Action Codes Description Comment    9/6/2019 11:38 AM Sharri Carlos Add [R10 9] Left lateral abdominal pain       ED Disposition     ED Disposition Condition Date/Time Comment    Discharge Stable Fri Sep 6, 2019 11:37 AM Radha Osorio discharge to home/self care  Follow-up Information     Follow up With Specialties Details Why Contact Info Additional Information    Your obgyn   To discuss getting an MRI of your abdomen or uterine biopsy for further evaluation of an abnormal appearance to your uterine lining  Ruth Garcia, DO Family Medicine In 1 week For further evaluation of your symptoms  [address]  Emergency Department Emergency Medicine  For fever, vomiting, change in stools, or worsening pain   [address] [facility] [place] [id number]  [address] [place] [address] ED, [address] [place], [place], [address]          Discharge Medication List as of 9/6/2019 11:41 AM      START taking these medications    Details   naproxen (NAPROSYN) 500 mg tablet Take 1 tablet (500 mg total) by mouth 2 (two) times a day with meals for 7 days, Starting Fri 9/6/2019, Until Fri 9/13/2019, Print         CONTINUE these medications which have NOT CHANGED    Details   clindamycin-benzoyl peroxide (BENZACLIN) gel Apply topically daily, Starting Tue 1/13/2015, Historical Med      Coenzyme Q10 (COQ10) 100 MG CAPS Take by mouth, Historical Med      dexlansoprazole (DEXILANT) 60 MG capsule Take 1 capsule (60 mg total) by mouth daily, Starting Wed 7/24/2019, Normal      ergocalciferol (ERGOCALCIFEROL) 36020 units capsule TAKE 1 CAPSULE (50,000 UNITS) BY MOUTH WEEKLY, Historical Med      Ferrous Sulfate (IRON) 325 (65 Fe) MG TABS Take by mouth, Historical Med      Omega-3 Fatty Acids (FISH OIL) 645 MG CAPS Take 2 capsules by mouth daily, Historical Med      pantoprazole (PROTONIX) 40 mg tablet Take 1 tablet (40 mg total) by mouth daily, Starting Thu 8/1/2019, Normal      Specialty Vitamins Products (HEART) 140 MG TABS Take by mouth, Historical Med      VITAMIN B COMPLEX-C CAPS Take by mouth, Historical Med           No discharge procedures on file      ED Provider  Electronically Signed by           Margy Alba MD  09/06/19 9305

## 2019-09-17 ENCOUNTER — OFFICE VISIT (OUTPATIENT)
Dept: GASTROENTEROLOGY | Facility: CLINIC | Age: 54
End: 2019-09-17
Payer: COMMERCIAL

## 2019-09-17 VITALS
HEART RATE: 77 BPM | SYSTOLIC BLOOD PRESSURE: 120 MMHG | WEIGHT: 209.2 LBS | BODY MASS INDEX: 34.85 KG/M2 | DIASTOLIC BLOOD PRESSURE: 78 MMHG | HEIGHT: 65 IN

## 2019-09-17 DIAGNOSIS — R14.0 BLOATING: Primary | ICD-10-CM

## 2019-09-17 PROCEDURE — 99213 OFFICE O/P EST LOW 20 MIN: CPT | Performed by: PHYSICIAN ASSISTANT

## 2019-09-17 NOTE — PROGRESS NOTES
[facility] - Outpatient Follow-up Note  Abner Izquierdo 47 y o  female MRN: [id number]  Encounter: [id number]          ASSESSMENT AND PLAN:      1  Bloating  She reports years of bloating  She has failed treatment with simethicone, PPIs, H2RAs, low carbohydrate diet, and probiotics  She reports a new left flank pain with no associated symptoms - CT in the ER was negative for GI pathology  EGD and Colonoscopy on 8/1 were normal   Will try Xifaxan  Discussed use of Gluten free or low FODMAP diet    ______________________________________________________________________    SUBJECTIVE:  60-year-old female presents for follow-up of bloating  This has been problematic for several years  She states that the symptom occurs every day  She will start at the day feeling okay however as the day progresses symptoms becomes worse  She has tried several treatments without improvement  She has been on simethicone products, PPIs, H2 RA, low-carbohydrate diet him probiotics without relief  She had a negative EGD and colonoscopy on August 1st   She is taking pantoprazole 40 mg daily at present  She is following a low-carbohydrate diet at present as she is trying to lose weight  She states that her bowel movements are regular  She denies any bloody stools  She denies any nausea or vomiting she has developed a new left-sided flank pain  She went to the emergency room on September 6 for evaluation  CT was performed which showed a heterogeneous uterus with prominent junctional and a right adnexal cyst   She reports that bending her head forward and sneezing increase the pain  She saw her chiropractor who did not feel it was musculoskeletal     REVIEW OF SYSTEMS IS OTHERWISE NEGATIVE        Historical Information   Past Medical History:   Diagnosis Date    Colon polyp     Diverticulitis of colon     Hashimoto's disease     Pre-diabetes      Past Surgical History:   Procedure Laterality Date  CHOLECYSTECTOMY      COLON SURGERY      TONSILLECTOMY      UTERINE FIBROID EMBOLIZATION       Social History   Social History     Substance and Sexual Activity   Alcohol Use Yes    Comment: occasional     Social History     Substance and Sexual Activity   Drug Use Never     Social History     Tobacco Use   Smoking Status Never Smoker   Smokeless Tobacco Never Used     Family History   Problem Relation Age of Onset    Hypertension Mother     Diabetes Mother     Hypertension Father     Diabetes Father        Meds/Allergies       Current Outpatient Medications:     clindamycin-benzoyl peroxide (BENZACLIN) gel    Coenzyme Q10 (COQ10) 100 MG CAPS    ergocalciferol (ERGOCALCIFEROL) 35161 units capsule    Ferrous Sulfate (IRON) 325 (65 Fe) MG TABS    Omega-3 Fatty Acids (FISH OIL) 645 MG CAPS    pantoprazole (PROTONIX) 40 mg tablet    Specialty Vitamins Products (HEART) 140 MG TABS    VITAMIN B COMPLEX-C CAPS    naproxen (NAPROSYN) 500 mg tablet    rifaximin (XIFAXAN) 550 mg tablet    No Known Allergies        Objective     Blood pressure 120/78, pulse 77, height 5' 5" (1 651 m), weight 94 9 kg (209 lb 3 2 oz), last menstrual period 09/02/2019  Body mass index is 34 81 kg/m²  PHYSICAL EXAM:      General Appearance:   Alert, cooperative, no distress   HEENT:   Normocephalic, atraumatic, anicteric      Neck:  Supple, symmetrical, trachea midline   Lungs:   Clear to auscultation bilaterally; no rales, rhonchi or wheezing; respirations unlabored    Heart[de-identified]   Regular rate and rhythm; no murmur, rub, or gallop  Abdomen:   Soft, non-tender, non-distended; normal bowel sounds; no masses, no organomegaly    Genitalia:   Deferred    Rectal:   Deferred    Extremities:  No cyanosis, clubbing or edema    Pulses:  2+ and symmetric    Skin:  No jaundice, rashes, or lesions    Lymph nodes:  No palpable cervical lymphadenopathy        Lab Results:   No visits with results within 1 Day(s) from this visit  Latest known visit with results is:   Admission on 09/06/2019, Discharged on 09/06/2019   Component Date Value    WBC 09/06/2019 8 73     RBC 09/06/2019 4 11     Hemoglobin 09/06/2019 12 1     Hematocrit 09/06/2019 37 8     MCV 09/06/2019 92     MCH 09/06/2019 29 4     MCHC 09/06/2019 32 0     RDW 09/06/2019 12 4     MPV 09/06/2019 10 6     Platelets 15/06/0001 289     nRBC 09/06/2019 0     Neutrophils Relative 09/06/2019 66     Immat GRANS % 09/06/2019 0     Lymphocytes Relative 09/06/2019 23     Monocytes Relative 09/06/2019 7     Eosinophils Relative 09/06/2019 3     Basophils Relative 09/06/2019 1     Neutrophils Absolute 09/06/2019 5 78     Immature Grans Absolute 09/06/2019 0 03     Lymphocytes Absolute 09/06/2019 1 96     Monocytes Absolute 09/06/2019 0 65     Eosinophils Absolute 09/06/2019 0 27     Basophils Absolute 09/06/2019 0 04     Sodium 09/06/2019 139     Potassium 09/06/2019 3 7     Chloride 09/06/2019 102     CO2 09/06/2019 29     ANION GAP 09/06/2019 8     BUN 09/06/2019 17     Creatinine 09/06/2019 0 91     Glucose 09/06/2019 123     Calcium 09/06/2019 8 9     AST 09/06/2019 18     ALT 09/06/2019 33     Alkaline Phosphatase 09/06/2019 62     Total Protein 09/06/2019 7 6     Albumin 09/06/2019 3 8     Total Bilirubin 09/06/2019 0 40     eGFR 09/06/2019 72     Lipase 09/06/2019 212     EXT PREG TEST UR (Ref: N* 09/06/2019 Negative     Control 09/06/2019 Valid     Clarity, UA 09/06/2019 Clear     Color, UA 09/06/2019 Yellow     Specific Gravity, UA 09/06/2019 <=1 005     pH, UA 09/06/2019 6 5     Glucose, UA 09/06/2019 Negative     Ketones, UA 09/06/2019 Negative     Blood, UA 09/06/2019 Large*    Protein, UA 09/06/2019 Negative     Nitrite, UA 09/06/2019 Negative     Bilirubin, UA 09/06/2019 Negative     Urobilinogen, UA 09/06/2019 0 2     Leukocytes, UA 09/06/2019 Negative     WBC, UA 09/06/2019 None Seen     RBC, UA 09/06/2019 4-10*    Bacteria, UA 09/06/2019 None Seen     Epithelial Cells 09/06/2019 Occasional          Radiology Results:   Ct Abdomen Pelvis With Contrast    Result Date: 9/6/2019  Narrative: CT ABDOMEN AND PELVIS WITH IV CONTRAST INDICATION:   L sided abdominal pain radating from L flank  COMPARISON:  None  TECHNIQUE:  CT examination of the abdomen and pelvis was performed  Axial, sagittal, and coronal 2D reformatted images were created from the source data and submitted for interpretation  Radiation dose length product (DLP) for this visit:  580 mGy-cm   This examination, like all CT scans performed in the [facility], was performed utilizing techniques to minimize radiation dose exposure, including the use of iterative reconstruction and automated exposure control  IV Contrast:  100 mL of iohexol (OMNIPAQUE) Enteric Contrast:  Enteric contrast was not administered  FINDINGS: ABDOMEN LOWER CHEST:  No clinically significant abnormality identified in the visualized lower chest  LIVER/BILIARY TREE:  Unremarkable  GALLBLADDER:  Gallbladder surgically absent SPLEEN:  Unremarkable  PANCREAS:  Unremarkable  ADRENAL GLANDS:  Unremarkable  KIDNEYS/URETERS:  Unremarkable  No hydronephrosis  STOMACH AND BOWEL:  Diverticulosis seen  No abnormal dilation of small bowel loops seen APPENDIX:  No findings to suggest appendicitis  ABDOMINOPELVIC CAVITY:  No ascites or free intraperitoneal air  No lymphadenopathy  VESSELS:  Celiac trunk, SMA are patent PELVIS REPRODUCTIVE ORGANS:  A right ovarian cyst seen, measuring about 3 cm x 2 4 x 3 cm 9 a left ovarian cyst seen measuring about 1 7 cm x 1 5 cm Nabothian cysts are seen within the cervix Focus of calcification in the fundal region seen Uterus demonstrates lobulated contour suggesting fibroids  The uterus is heterogenous with prominence of junctional zone, which may be related to adenomyosis or related to areas of fibroid embolization URINARY BLADDER:  Unremarkable   ABDOMINAL WALL/INGUINAL REGIONS:  Unremarkable  OSSEOUS STRUCTURES:  No acute fracture or destructive osseous lesion  Impression: No acute inflammatory stranding Diverticulosis  Heterogenous uterus with the prominent junctional zone, consider MRI with the prominence of the junctional zone may be due to adenomyosis or due to sequela of fibroid embolization    Consider MRI for further evaluation The right adnexal cyst measuring about 3 1 x 2 7 cm likely a functional cyst Nabothian cysts within the cervix  I personally discussed this study with Gonzalo Hudson on 9/6/2019 at 11:11 AM   Workstation performed: [id number]

## 2019-10-30 ENCOUNTER — OFFICE VISIT (OUTPATIENT)
Dept: GASTROENTEROLOGY | Facility: CLINIC | Age: 54
End: 2019-10-30
Payer: COMMERCIAL

## 2019-10-30 VITALS
BODY MASS INDEX: 34.52 KG/M2 | SYSTOLIC BLOOD PRESSURE: 118 MMHG | WEIGHT: 207.2 LBS | DIASTOLIC BLOOD PRESSURE: 82 MMHG | HEIGHT: 65 IN | HEART RATE: 72 BPM

## 2019-10-30 DIAGNOSIS — R14.0 BLOATING: Primary | ICD-10-CM

## 2019-10-30 PROCEDURE — 99213 OFFICE O/P EST LOW 20 MIN: CPT | Performed by: PHYSICIAN ASSISTANT

## 2019-10-30 RX ORDER — IBUPROFEN 200 MG
400 TABLET ORAL EVERY 6 HOURS PRN
COMMUNITY

## 2019-10-30 RX ORDER — ACETAMINOPHEN 500 MG
1000 TABLET ORAL EVERY 6 HOURS PRN
COMMUNITY

## 2019-10-30 RX ORDER — OLOPATADINE HYDROCHLORIDE 7 MG/ML
SOLUTION OPHTHALMIC
Refills: 3 | COMMUNITY
Start: 2019-10-14

## 2019-10-30 NOTE — PROGRESS NOTES
[facility] - Outpatient Follow-up Note  Tommy Anderson 47 y o  female MRN: [id number]  Encounter: [id number]          ASSESSMENT AND PLAN:      1  Bloating  She admits to slight improvement  She is s/p Xifaxan course and D+C  She continues to maintain a low carbohydrate diet  She is having a full hysterectomy in December  She will call in January to f/u based upon current symptoms    ______________________________________________________________________    SUBJECTIVE:  54-year-old female with chronic bloating presents for follow-up  After her last visit she completed the Xifaxan course and had a D&C  She admits to slight improvement in symptoms  She continues to deny diarrhea or constipation  There is no nausea/vomiting, heartburn or weight loss  She is scheduled for a total hysterectomy in December as there were some cancerous cells noted on her D&C  REVIEW OF SYSTEMS IS OTHERWISE NEGATIVE        Historical Information   Past Medical History:   Diagnosis Date    Colon polyp     Diverticulitis of colon     Hashimoto's disease     Pre-diabetes      Past Surgical History:   Procedure Laterality Date    CHOLECYSTECTOMY      COLON SURGERY      TONSILLECTOMY      UTERINE FIBROID EMBOLIZATION       Social History   Social History     Substance and Sexual Activity   Alcohol Use Yes    Comment: occasional     Social History     Substance and Sexual Activity   Drug Use Never     Social History     Tobacco Use   Smoking Status Never Smoker   Smokeless Tobacco Never Used     Family History   Problem Relation Age of Onset    Hypertension Mother     Diabetes Mother     Hypertension Father     Diabetes Father        Meds/Allergies       Current Outpatient Medications:     acetaminophen (TYLENOL) 500 mg tablet    clindamycin-benzoyl peroxide (BENZACLIN) gel    Coenzyme Q10 (COQ10) 100 MG CAPS    ergocalciferol (ERGOCALCIFEROL) 42873 units capsule    Ferrous Sulfate (IRON) 325 (65 Fe) MG TABS    ibuprofen (MOTRIN) 200 mg tablet    Omega-3 Fatty Acids (FISH OIL) 645 MG CAPS    pantoprazole (PROTONIX) 40 mg tablet    PAZEO 0 7 % SOLN    Specialty Vitamins Products (HEART) 140 MG TABS    VITAMIN B COMPLEX-C CAPS    naproxen (NAPROSYN) 500 mg tablet    No Known Allergies        Objective     Blood pressure 118/82, pulse 72, height 5' 5" (1 651 m), weight 94 kg (207 lb 3 2 oz)  Body mass index is 34 48 kg/m²  PHYSICAL EXAM:      General Appearance:   Alert, cooperative, no distress   HEENT:   Normocephalic, atraumatic, anicteric      Neck:  Supple, symmetrical, trachea midline   Lungs:   Clear to auscultation bilaterally; no rales, rhonchi or wheezing; respirations unlabored    Heart[de-identified]   Regular rate and rhythm; no murmur, rub, or gallop  Abdomen:   Soft, non-tender, non-distended; normal bowel sounds; no masses, no organomegaly    Genitalia:   Deferred    Rectal:   Deferred    Extremities:  No cyanosis, clubbing or edema    Pulses:  2+ and symmetric    Skin:  No jaundice, rashes, or lesions    Lymph nodes:  No palpable cervical lymphadenopathy        Lab Results:   No visits with results within 1 Day(s) from this visit     Latest known visit with results is:   Admission on 09/06/2019, Discharged on 09/06/2019   Component Date Value    WBC 09/06/2019 8 73     RBC 09/06/2019 4 11     Hemoglobin 09/06/2019 12 1     Hematocrit 09/06/2019 37 8     MCV 09/06/2019 92     MCH 09/06/2019 29 4     MCHC 09/06/2019 32 0     RDW 09/06/2019 12 4     MPV 09/06/2019 10 6     Platelets 56/51/4741 289     nRBC 09/06/2019 0     Neutrophils Relative 09/06/2019 66     Immat GRANS % 09/06/2019 0     Lymphocytes Relative 09/06/2019 23     Monocytes Relative 09/06/2019 7     Eosinophils Relative 09/06/2019 3     Basophils Relative 09/06/2019 1     Neutrophils Absolute 09/06/2019 5 78     Immature Grans Absolute 09/06/2019 0 03     Lymphocytes Absolute 09/06/2019 1 96     Monocytes Absolute 09/06/2019 0 65     Eosinophils Absolute 09/06/2019 0 27     Basophils Absolute 09/06/2019 0 04     Sodium 09/06/2019 139     Potassium 09/06/2019 3 7     Chloride 09/06/2019 102     CO2 09/06/2019 29     ANION GAP 09/06/2019 8     BUN 09/06/2019 17     Creatinine 09/06/2019 0 91     Glucose 09/06/2019 123     Calcium 09/06/2019 8 9     AST 09/06/2019 18     ALT 09/06/2019 33     Alkaline Phosphatase 09/06/2019 62     Total Protein 09/06/2019 7 6     Albumin 09/06/2019 3 8     Total Bilirubin 09/06/2019 0 40     eGFR 09/06/2019 72     Lipase 09/06/2019 212     EXT PREG TEST UR (Ref: N* 09/06/2019 Negative     Control 09/06/2019 Valid     Clarity, UA 09/06/2019 Clear     Color, UA 09/06/2019 Yellow     Specific Gravity, UA 09/06/2019 <=1 005     pH, UA 09/06/2019 6 5     Glucose, UA 09/06/2019 Negative     Ketones, UA 09/06/2019 Negative     Blood, UA 09/06/2019 Large*    Protein, UA 09/06/2019 Negative     Nitrite, UA 09/06/2019 Negative     Bilirubin, UA 09/06/2019 Negative     Urobilinogen, UA 09/06/2019 0 2     Leukocytes, UA 09/06/2019 Negative     WBC, UA 09/06/2019 None Seen     RBC, UA 09/06/2019 4-10*    Bacteria, UA 09/06/2019 None Seen     Epithelial Cells 09/06/2019 Occasional          Radiology Results:   No results found

## 2022-02-10 ENCOUNTER — OFFICE VISIT (OUTPATIENT)
Dept: DERMATOLOGY | Facility: CLINIC | Age: 57
End: 2022-02-10
Payer: COMMERCIAL

## 2022-02-10 VITALS — TEMPERATURE: 98 F | WEIGHT: 201.6 LBS | BODY MASS INDEX: 33.55 KG/M2

## 2022-02-10 DIAGNOSIS — Z13.89 SCREENING FOR SKIN CONDITION: ICD-10-CM

## 2022-02-10 DIAGNOSIS — D22.9 NEVUS: ICD-10-CM

## 2022-02-10 DIAGNOSIS — L72.9 FOLLICULAR CYST OF SKIN: ICD-10-CM

## 2022-02-10 DIAGNOSIS — L72.11 PILAR CYST: Primary | ICD-10-CM

## 2022-02-10 PROCEDURE — 99203 OFFICE O/P NEW LOW 30 MIN: CPT | Performed by: DERMATOLOGY

## 2022-02-10 NOTE — PATIENT INSTRUCTIONS
Follicular cyst advised patient that this is from hair follicles that ballooned out and forms this type of growth  No treatment indicated unless patient so desires or if lesion enlarges or changes  Pilar cystadvised patient that this is from hair follicles that ballooned out and forms this type of growth  No treatment indicated unless patient so desires or if lesion enlarges or changes    Nevi reviewed the concept of ABCDE and ugly duckling nothing markedly atypical patient reassured  Screening for Dermatologic Disorders: Nothing else of concern noted on complete exam follow up in 1 year

## 2022-02-10 NOTE — PROGRESS NOTES
[facility] DERMATOLOGY  [address] [place] [id number]  [phone]  [phone]     MRN: [id number] : 1965  Encounter: [id number]  Patient Information: Elsa Samm  Chief complaint: skin cancer check up    History of present illness:  59-year-old female who had not seen for over 5 years with history Pilar follicular cyst presents for follow-up patient concerned regarding several cyst that developed in concerned that the getting larger and are irritating no other concerns noted  Past Medical History:   Diagnosis Date    Colon polyp     Diverticulitis of colon     Hashimoto's disease     Pre-diabetes      Past Surgical History:   Procedure Laterality Date    CHOLECYSTECTOMY      COLON SURGERY      TONSILLECTOMY      UTERINE FIBROID EMBOLIZATION       Social History   Social History     Substance and Sexual Activity   Alcohol Use Yes    Comment: occasional     Social History     Substance and Sexual Activity   Drug Use Never     Social History     Tobacco Use   Smoking Status Never Smoker   Smokeless Tobacco Never Used     Family History   Problem Relation Age of Onset    Hypertension Mother     Diabetes Mother     Hypertension Father     Diabetes Father      Meds/Allergies   No Known Allergies    Meds:  Prior to Admission medications    Medication Sig Start Date End Date Taking?  Authorizing Provider   Coenzyme Q10 (COQ10) 100 MG CAPS Take by mouth   Yes Historical Provider, MD   ibuprofen (MOTRIN) 200 mg tablet Take 400 mg by mouth every 6 (six) hours as needed   Yes Historical Provider, MD   Omega-3 Fatty Acids (FISH OIL) 645 MG CAPS Take 2 capsules by mouth daily   Yes Historical Provider, MD   VITAMIN B COMPLEX-C CAPS Take by mouth   Yes Historical Provider, MD   acetaminophen (TYLENOL) 500 mg tablet Take 1,000 mg by mouth every 6 (six) hours as needed  Patient not taking: Reported on 2/10/2022     Historical Provider, MD clindamycin-benzoyl peroxide (BENZACLIN) gel Apply topically daily  Patient not taking: Reported on 2/10/2022  1/13/15   Historical Provider, MD   ergocalciferol (ERGOCALCIFEROL) 37355 units capsule TAKE 1 CAPSULE (50,000 UNITS) BY MOUTH WEEKLY 8/12/17   Historical Provider, MD   Ferrous Sulfate (IRON) 325 (65 Fe) MG TABS Take by mouth  Patient not taking: Reported on 2/10/2022     Historical Provider, MD   naproxen (NAPROSYN) 500 mg tablet Take 1 tablet (500 mg total) by mouth 2 (two) times a day with meals for 7 days 9/6/19 9/13/19  Beth Lindsay MD   pantoprazole (PROTONIX) 40 mg tablet Take 1 tablet (40 mg total) by mouth daily  Patient not taking: Reported on 2/10/2022  8/1/19   Amy Orr DO   PAZEO 0 7 % SOLN 1 DROP INTO BOTH EYES ONCE A DAY  Patient not taking: Reported on 2/10/2022 10/14/19   Historical Provider, MD   Specialty Vitamins Products (HEART) 140 MG TABS Take by mouth  Patient not taking: Reported on 2/10/2022     Historical Provider, MD       Subjective:     Review of Systems:    General: negative for - chills, fatigue, fever,  weight gain or weight loss  Psychological: negative for - anxiety, behavioral disorder, concentration difficulties, decreased libido, depression, irritability, memory difficulties, mood swings, sleep disturbances or suicidal ideation  ENT: negative for - hearing difficulties , nasal congestion, nasal discharge, oral lesions, sinus pain, sneezing, sore throat  Allergy and Immunology: negative for - hives, insect bite sensitivity,  Hematological and Lymphatic: negative for - bleeding problems, blood clots,bruising, swollen lymph nodes  Endocrine: negative for - hair pattern changes, hot flashes, malaise/lethargy, mood swings, palpitations, polydipsia/polyuria, skin changes, temperature intolerance or unexpected weight change  Respiratory: negative for - cough, hemoptysis, orthopnea, shortness of breath, or wheezing  Cardiovascular: negative for - chest pain, dyspnea on exertion, edema,  Gastrointestinal: negative for - abdominal pain, nausea/vomiting  Genito-Urinary: negative for - dysuria, incontinence, irregular/heavy menses or urinary frequency/urgency  Musculoskeletal: negative for - gait disturbance, joint pain, joint stiffness, joint swelling, muscle pain, muscular weakness  Dermatological:  As in HPI  Neurological: negative for confusion, dizziness, headaches, impaired coordination/balance, memory loss, numbness/tingling, seizures, speech problems, tremors or weakness       Objective:   Temp 98 °F (36 7 °C) (Temporal)   Wt 91 4 kg (201 lb 9 6 oz)   BMI 33 55 kg/m²     Physical Exam:    General Appearance:    Alert, cooperative, no distress   Head:    Normocephalic, without obvious abnormality, atraumatic           Skin:   A full skin exam was performed including scalp, head scalp, eyes, ears, nose, lips, neck, chest, axilla, abdomen, back, buttocks, bilateral upper extremities, bilateral lower extremities, hands, feet, fingers, toes, fingernails, and toenails cystic nodule noted on the right posterior scalp 1 on the right anterior neck to on the back all measured less than 1 cm size normal pigmented lesions regular shape color nothing markedly atypical noted on complete exam     Assessment:     1  Pilar cyst     2  Follicular cyst of skin     3  Nevus     4  Screening for skin condition           Plan: Follicular cyst advised patient that this is from hair follicles that ballooned out and forms this type of growth  No treatment indicated unless patient so desires or if lesion enlarges or changes  Pilar cystadvised patient that this is from hair follicles that ballooned out and forms this type of growth  No treatment indicated unless patient so desires or if lesion enlarges or changes    Nevi reviewed the concept of ABCDE and ugly duckling nothing markedly atypical patient reassured  Screening for Dermatologic Disorders: Nothing else of concern noted on complete exam follow up in 1 year           Stoney Silveira MD  2/10/2022,8:48 AM    Portions of the record may have been created with voice recognition software   Occasional wrong word or "sound a like" substitutions may have occurred due to the inherent limitations of voice recognition software   Read the chart carefully and recognize, using context, where substitutions have occurred

## 2022-03-14 ENCOUNTER — PROCEDURE VISIT (OUTPATIENT)
Dept: DERMATOLOGY | Facility: CLINIC | Age: 57
End: 2022-03-14
Payer: COMMERCIAL

## 2022-03-14 DIAGNOSIS — L72.9 FOLLICULAR CYST OF SKIN: ICD-10-CM

## 2022-03-14 DIAGNOSIS — L72.11 PILAR CYST: Primary | ICD-10-CM

## 2022-03-14 PROCEDURE — 11402 EXC TR-EXT B9+MARG 1.1-2 CM: CPT | Performed by: DERMATOLOGY

## 2022-03-14 PROCEDURE — 88304 TISSUE EXAM BY PATHOLOGIST: CPT | Performed by: STUDENT IN AN ORGANIZED HEALTH CARE EDUCATION/TRAINING PROGRAM

## 2022-03-14 PROCEDURE — 11421 EXC H-F-NK-SP B9+MARG 0.6-1: CPT | Performed by: DERMATOLOGY

## 2022-03-14 NOTE — PATIENT INSTRUCTIONS
Cyst x2 removed because the patient concern and irritation  Wound care instructions given to patient

## 2022-03-14 NOTE — PROGRESS NOTES
[facility] DERMATOLOGY  [address]  [facility] [address]  [phone]  [phone]     MRN: [id number] : 1965  Encounter: [id number]  Patient Information: Madison Burks    Subjective:     49-year-old female presents for planned excision of previously noted cyst on the scalp and upper back     Objective: There were no vitals taken for this visit  Physical Exam:    General Appearance:    Alert, cooperative, no distress   Skin:   Previous site of skin lesions noted on on the scalp and upper back  Procedure name:  Simple Excision with primary closure of cyst        Location:  Scalp    Diagnosis:  Pilar cyst    Size of lesion: 0 7 cm      I explained the diagnosis to the patient and we recommend an excision of the lesion for diagnosis and/or treatment  Potential complications include, but are not limited to: scarring, bleeding, infection, incomplete removal, recurrence, and nerve damage  The risks, benefits, and alternatives were discussed with the patient in detail  The location of the tumor was identified, circled, and confirmed by the patient  The correct patient, site, and procedure were confirmed  Anesthesia: 1% xylocaine with 1:100,000 epinephrine 3 cc  The patient was brought into the room, prepped and draped sterilely in the usual manner and anesthesia was administered by local infiltration  A linear incision was drawn across the lesion, and the area was incised to the cyst wall with a number 15c Bard-Lux blade  The cyst was removed with sharp and blunt dissection  Hemostasis was achieved with cautery  A primary closure was obtained The wound was cleaned with hydrogen peroxide, dried off, petroleum applied, and the wound was covered with a pressure dressing  The patient was given detailed verbal and written instructions on postoperative care            Suture used to approximate epidermis: 4-0  prolene 2 suture    Final wound length:1 cm    Follow-up in: 10 days  Procedure name:  Simple Excision with primary closure of cyst        Location:  MID UPPER BACK    Diagnosis:  FOLLICULAR CYST    Size of lesion:1 1cm      I explained the diagnosis to the patient and we recommend an excision of the lesion for diagnosis and/or treatment  Potential complications include, but are not limited to: scarring, bleeding, infection, incomplete removal, recurrence, and nerve damage  The risks, benefits, and alternatives were discussed with the patient in detail  The location of the tumor was identified, circled, and confirmed by the patient  The correct patient, site, and procedure were confirmed  Anesthesia: 1% xylocaine with 1:100,000 epinephrine 3 cc  The patient was brought into the room, prepped and draped sterilely in the usual manner and anesthesia was administered by local infiltration  A linear incision was drawn across the lesion, and the area was incised to the cyst wall with a number 15c Bard-Lux blade  The cyst was removed with sharp and blunt dissection  Hemostasis was achieved with cautery  A primary closure was obtained The wound was cleaned with hydrogen peroxide, dried off, petroleum applied, and the wound was covered with a pressure dressing  The patient was given detailed verbal and written instructions on postoperative care  Suture used to approximate epidermis: 4-0  prolene 3 suture    Final wound length: 1 5 cm    Follow-up in: 10 days  Assessment:     1  Pilar cyst     2  Follicular cyst of skin           Plan:         Prior to Admission medications    Medication Sig Start Date End Date Taking?  Authorizing Provider   Coenzyme Q10 (COQ10) 100 MG CAPS Take by mouth   Yes Historical Provider, MD   ergocalciferol (ERGOCALCIFEROL) 13830 units capsule TAKE 1 CAPSULE (50,000 UNITS) BY MOUTH WEEKLY 8/12/17  Yes Historical Provider, MD   ibuprofen (MOTRIN) 200 mg tablet Take 400 mg by mouth every 6 (six) hours as needed   Yes Historical Provider, MD   Omega-3 Fatty Acids (FISH OIL) 645 MG CAPS Take 2 capsules by mouth daily   Yes Historical Provider, MD   VITAMIN B COMPLEX-C CAPS Take by mouth   Yes Historical Provider, MD   acetaminophen (TYLENOL) 500 mg tablet Take 1,000 mg by mouth every 6 (six) hours as needed  Patient not taking: Reported on 2/10/2022     Historical Provider, MD   clindamycin-benzoyl peroxide (BENZACLIN) gel Apply topically daily  Patient not taking: Reported on 2/10/2022  1/13/15   Historical Provider, MD   Ferrous Sulfate (IRON) 325 (65 Fe) MG TABS Take by mouth  Patient not taking: Reported on 2/10/2022     Historical Provider, MD   naproxen (NAPROSYN) 500 mg tablet Take 1 tablet (500 mg total) by mouth 2 (two) times a day with meals for 7 days 9/6/19 9/13/19  Lidlinda Lindsay MD   pantoprazole (PROTONIX) 40 mg tablet Take 1 tablet (40 mg total) by mouth daily  Patient not taking: Reported on 2/10/2022  8/1/19   Amy Orr DO   PAZEO 0 7 % SOLN 1 DROP INTO BOTH EYES ONCE A DAY  Patient not taking: Reported on 2/10/2022 10/14/19   Historical Provider, MD   Specialty Vitamins Products (HEART) 140 MG TABS Take by mouth  Patient not taking: Reported on 2/10/2022     Historical Provider, MD     No Known Allergies    Camden Mendenhall MD  3/14/2022,2:27 PM    Portions of the record may have been created with voice recognition software   Occasional wrong word or "sound a like" substitutions may have occurred due to the inherent limitations of voice recognition software   Read the chart carefully and recognize, using context, where substitutions have occurred

## 2022-03-21 ENCOUNTER — TELEPHONE (OUTPATIENT)
Dept: DERMATOLOGY | Facility: CLINIC | Age: 57
End: 2022-03-21

## 2022-03-21 NOTE — TELEPHONE ENCOUNTER
----- Message from Zechariah Gleason MD sent at 3/21/2022 12:24 PM EDT -----  Please call her of normal pathology

## 2022-03-24 ENCOUNTER — CLINICAL SUPPORT (OUTPATIENT)
Dept: DERMATOLOGY | Facility: CLINIC | Age: 57
End: 2022-03-24

## 2022-03-24 DIAGNOSIS — L57.0 LICHENOID KERATOSIS: ICD-10-CM

## 2022-03-24 DIAGNOSIS — L72.11 PILAR CYST: ICD-10-CM

## 2022-03-24 DIAGNOSIS — L72.9 FOLLICULAR CYST OF SKIN: Primary | ICD-10-CM

## 2022-03-24 PROCEDURE — 99024 POSTOP FOLLOW-UP VISIT: CPT | Performed by: DERMATOLOGY

## 2022-03-24 NOTE — PROGRESS NOTES
[facility] DERMATOLOGY  [address] [place] [id number]  [phone]  [phone]     MRN: [id number] : 1965  Encounter: [id number]  Patient Information: Kaylah Klein    Subjective:     59-year-old female presents for previously excised Pilar cyst scalp and follicular cyst on the upper back and planned suture removal patient also concerned regarding red spot on her right thigh she states it has been present for about a year     Objective: There were no vitals taken for this visit  Physical Exam:    General Appearance:    Alert, cooperative, no distress   Skin:   Previous sites of excision healing well 4 mm erythematous keratotic area noted on the right thigh no real changes of concern noted on dermoscopy  Procedure:  Suture removal  Site of excision:  Scalp and back  Wound was cleansed with saline  Wound is intact  Sutures removed  Steri-Strips applied  Biopsy revealed Pilar cyst follicular cyst respectively     Assessment:     1  Follicular cyst of skin     2  Pilar cyst     3  Lichenoid keratosis           Plan: Follicular cyst Pilar cyst no further treatment needed wound care instructions given the patient  Lesion on the right thigh appears to be a lichenoid keratosis patient advise that this is just inflamed growth no treatment needed at this point of unless further changes      Prior to Admission medications    Medication Sig Start Date End Date Taking?  Authorizing Provider   acetaminophen (TYLENOL) 500 mg tablet Take 1,000 mg by mouth every 6 (six) hours as needed  Patient not taking: Reported on 2/10/2022     Historical Provider, MD   clindamycin-benzoyl peroxide (BENZACLIN) gel Apply topically daily  Patient not taking: Reported on 2/10/2022  1/13/15   Historical Provider, MD   Coenzyme Q10 (COQ10) 100 MG CAPS Take by mouth    Historical Provider, MD   ergocalciferol (ERGOCALCIFEROL) 09639 units capsule TAKE 1 CAPSULE (50,000 UNITS) BY MOUTH WEEKLY 8/12/17   Historical Provider, MD   Ferrous Sulfate (IRON) 325 (65 Fe) MG TABS Take by mouth  Patient not taking: Reported on 2/10/2022     Historical Provider, MD   ibuprofen (MOTRIN) 200 mg tablet Take 400 mg by mouth every 6 (six) hours as needed    Historical Provider, MD   naproxen (NAPROSYN) 500 mg tablet Take 1 tablet (500 mg total) by mouth 2 (two) times a day with meals for 7 days 9/6/19 9/13/19  Tristan Kelly MD   Omega-3 Fatty Acids (FISH OIL) 645 MG CAPS Take 2 capsules by mouth daily    Historical Provider, MD   pantoprazole (PROTONIX) 40 mg tablet Take 1 tablet (40 mg total) by mouth daily  Patient not taking: Reported on 2/10/2022  8/1/19   Ene Hernandez DO   PAZEO 0 7 % SOLN 1 DROP INTO BOTH EYES ONCE A DAY  Patient not taking: Reported on 2/10/2022 10/14/19   Historical Provider, MD   Specialty Vitamins Products (HEART) 140 MG TABS Take by mouth  Patient not taking: Reported on 2/10/2022     Historical Provider, MD   VITAMIN B COMPLEX-C CAPS Take by mouth    Historical Provider, MD     No Known Allergies    Chapis Levin MD  1/37/4505,1:00 AM    Portions of the record may have been created with voice recognition software   Occasional wrong word or "sound a like" substitutions may have occurred due to the inherent limitations of voice recognition software   Read the chart carefully and recognize, using context, where substitutions have occurred

## 2022-03-24 NOTE — PATIENT INSTRUCTIONS
Follicular cyst Pilar cyst no further treatment needed wound care instructions given the patient  Lesion on the right thigh appears to be a lichenoid keratosis patient advise that this is just inflamed growth no treatment needed at this point of unless further changes  STERI-STRIPS (BUTTERFLY) POST SURGERY        Steri-Strips have been placed over your incision site to give added support  Please continue to bathe the area as usual     Eventually the Steri-Strips will begin to peel off on the ends  Snip the raised ends off with scissors and let the rest fall off on their own  If you have any questions, please call our office   [phone]